# Patient Record
Sex: FEMALE | Race: WHITE
[De-identification: names, ages, dates, MRNs, and addresses within clinical notes are randomized per-mention and may not be internally consistent; named-entity substitution may affect disease eponyms.]

---

## 2020-02-04 ENCOUNTER — HOSPITAL ENCOUNTER (EMERGENCY)
Dept: HOSPITAL 72 - EMR | Age: 70
Discharge: LEFT BEFORE DISCHARGE | End: 2020-02-04
Payer: MEDICARE

## 2020-02-04 VITALS — HEIGHT: 64 IN | WEIGHT: 126 LBS | BODY MASS INDEX: 21.51 KG/M2

## 2020-02-04 VITALS — SYSTOLIC BLOOD PRESSURE: 142 MMHG | DIASTOLIC BLOOD PRESSURE: 80 MMHG

## 2020-02-04 VITALS — SYSTOLIC BLOOD PRESSURE: 147 MMHG | DIASTOLIC BLOOD PRESSURE: 73 MMHG

## 2020-02-04 VITALS — SYSTOLIC BLOOD PRESSURE: 142 MMHG | DIASTOLIC BLOOD PRESSURE: 75 MMHG

## 2020-02-04 VITALS — DIASTOLIC BLOOD PRESSURE: 78 MMHG | SYSTOLIC BLOOD PRESSURE: 139 MMHG

## 2020-02-04 DIAGNOSIS — E78.00: ICD-10-CM

## 2020-02-04 DIAGNOSIS — Z88.6: ICD-10-CM

## 2020-02-04 DIAGNOSIS — R42: Primary | ICD-10-CM

## 2020-02-04 DIAGNOSIS — R53.1: ICD-10-CM

## 2020-02-04 LAB
ADD MANUAL DIFF: NO
ALBUMIN SERPL-MCNC: 3.5 G/DL (ref 3.4–5)
ALBUMIN/GLOB SERPL: 1 {RATIO} (ref 1–2.7)
ALP SERPL-CCNC: 73 U/L (ref 46–116)
ALT SERPL-CCNC: 24 U/L (ref 12–78)
ANION GAP SERPL CALC-SCNC: 11 MMOL/L (ref 5–15)
AST SERPL-CCNC: 22 U/L (ref 15–37)
BASOPHILS NFR BLD AUTO: 0.9 % (ref 0–2)
BILIRUB SERPL-MCNC: 0.3 MG/DL (ref 0.2–1)
BUN SERPL-MCNC: 11 MG/DL (ref 7–18)
CALCIUM SERPL-MCNC: 8.3 MG/DL (ref 8.5–10.1)
CHLORIDE SERPL-SCNC: 103 MMOL/L (ref 98–107)
CO2 SERPL-SCNC: 26 MMOL/L (ref 21–32)
CREAT SERPL-MCNC: 0.9 MG/DL (ref 0.55–1.3)
EOSINOPHIL NFR BLD AUTO: 5.8 % (ref 0–3)
ERYTHROCYTE [DISTWIDTH] IN BLOOD BY AUTOMATED COUNT: 11.4 % (ref 11.6–14.8)
GLOBULIN SER-MCNC: 3.5 G/DL
HCT VFR BLD CALC: 35.2 % (ref 37–47)
HGB BLD-MCNC: 12.2 G/DL (ref 12–16)
LYMPHOCYTES NFR BLD AUTO: 41.3 % (ref 20–45)
MCV RBC AUTO: 90 FL (ref 80–99)
MONOCYTES NFR BLD AUTO: 6.2 % (ref 1–10)
NEUTROPHILS NFR BLD AUTO: 45.9 % (ref 45–75)
PLATELET # BLD: 222 K/UL (ref 150–450)
POTASSIUM SERPL-SCNC: 4.3 MMOL/L (ref 3.5–5.1)
RBC # BLD AUTO: 3.91 M/UL (ref 4.2–5.4)
SODIUM SERPL-SCNC: 140 MMOL/L (ref 136–145)
WBC # BLD AUTO: 7.1 K/UL (ref 4.8–10.8)

## 2020-02-04 PROCEDURE — 36415 COLL VENOUS BLD VENIPUNCTURE: CPT

## 2020-02-04 PROCEDURE — 80053 COMPREHEN METABOLIC PANEL: CPT

## 2020-02-04 PROCEDURE — 96374 THER/PROPH/DIAG INJ IV PUSH: CPT

## 2020-02-04 PROCEDURE — 70450 CT HEAD/BRAIN W/O DYE: CPT

## 2020-02-04 PROCEDURE — 85025 COMPLETE CBC W/AUTO DIFF WBC: CPT

## 2020-02-04 PROCEDURE — 70551 MRI BRAIN STEM W/O DYE: CPT

## 2020-02-04 PROCEDURE — 99284 EMERGENCY DEPT VISIT MOD MDM: CPT

## 2020-02-04 NOTE — NUR
ED Nurse Note: Pt brought in by ambulance c/o dizziness and vomiting x 3 hours. 
Pt became so dizzy that she sat herself on the floor. Pt denies fall/loss of 
consciousness/head injury. A+Ox4, speaking in full sentences. Respirations even 
and unlabored on room air. Pt is not actively vomiting at this time. Pt placed 
on monitor. Left FA 20 g inserted by paramedics flushed and patent.

## 2020-02-04 NOTE — NUR
AMA: SEE AMA FORM. Pt did not want to be admitted upstairs to telemetry because 
she "needs to get home." ED MD spoke with pt about risks and benefits, but pt 
still wished to leave AMA. Pt signed form and is aware of the need to come back 
to the ED if she feels any symptoms of dizziness or lightheadedness again. 
Sister @ bedside with patient. Respirations even and unlabored on room air. 
Vitals stable as documented. Prescription given to patient. Pt verbalizes 
understanding. ID and IV removed. Pt taking all belongings and walking with 
steady gait.

## 2020-02-04 NOTE — DIAGNOSTIC IMAGING REPORT
Indication: Syncope. Nausea vomiting

 

Technique: The head was imaged in a 1.5 Mirela magnet. Sequences obtained include

sagittal and axial T1 FLAIR, axial T2 fast spin echo with fat saturation, axial T2*

GRE, axial T2 FLAIR, diffusion and ADC map.

 

Comparison: None

 

Findings: 

 

The size, contour, and configuration of the sulci, ventricles, and basal cisterns

appear normal. Gray-white differentiation is normal.

 

 There is no restricted diffusion. There is no mass effect, midline shift, edema, or

hemorrhage. There are no abnormal extra-axial or intra-axial fluid collections. 

 

The corpus callosum is unremarkable. The brainstem and cerebellum are unremarkable.

The sella is unremarkable. Bone marrow signal within the visualized osseous

structures appears age appropriate and unremarkable otherwise.

 

 

Impression: Negative MRI brain without contrast.

## 2020-02-04 NOTE — EMERGENCY ROOM REPORT
History of Present Illness


General


Chief Complaint:  Dizziness


Source:  Patient


 (Stanton Head MD)





Present Illness


HPI


She is a 69-year-old female brought in by EMS after increased dizziness.  

Patient a prior history of high cholesterol as well as vertigo episodes in the 

past.  She reports of increased ringing to ears.   had acute onset of vertigo 

sensation.  Reports having recent episode which worse with head movement.  

Denies any weakness to her extremities.  Had recently traveled from [facility] but 

denies any diarrhea or vomit or vomiting since then and had previously been 

well.


 (Stanton Head MD)


Allergies:  


Coded Allergies:  


     CODEINE (Verified  Allergy, Intermediate, 2/4/20)





Review of Systems


All Other Systems:  negative except mentioned in HPI


 (Stanton Head MD)





Physical Exam





Vital Signs








  Date Time  Temp Pulse Resp B/P (MAP) Pulse Ox O2 Delivery O2 Flow Rate FiO2


 


2/4/20 12:47 98.1 58 16 147/73 (97) 97   








Sp02 EP Interpretation:  reviewed, normal


General Appearance:  normal inspection, well appearing, no apparent distress, 

alert, GCS 15


Head:  atraumatic


ENT:  normal ENT inspection, hearing grossly normal, normal voice


Neck:  normal inspection, full range of motion, supple, no bony tend


Respiratory:  normal inspection, lungs clear, normal breath sounds, no 

respiratory distress, no retraction, no wheezing


Cardiovascular #1:  regular rate, rhythm, no edema


Gastrointestinal:  normal inspection, normal bowel sounds, non tender, soft, no 

guarding, no hernia


Genitourinary:  no CVA tenderness


Musculoskeletal:  normal inspection, back normal, normal range of motion


Neurologic:  alert, oriented x3, responsive, speech normal, normal inspection, 

other - Nystagmus with leftward gaze.


Psychiatric:  normal inspection, judgement/insight normal, mood/affect normal


 (Stanton Head MD)





Medical Decision Making


Diagnostic Impression:  


 Primary Impression:  


 Dizziness


 Additional Impression:  


 Possible TIA


ER Course


Patient presented for increased dizziness and weakness.  Differential diagnosis 

include was not limited to transient ischemic attack, peripheral vertigo, 

vertebrobasilar insufficiency, labyrinthitis, among others.  Because of 

complexity of patient's case laboratory tests and imaging studies were ordered.

  Patient's laboratory testing was unremarkable.  No hyponatremia or  anemia.  

CT imaging read by radiology showed no evidence of acute intracranial 

hemorrhage or CVA.  Patient was endorsed to Dr. Lewis pending further imaging 

studies.





Labs








Test


  2/4/20


13:20


 


White Blood Count


  7.1 K/UL


(4.8-10.8)


 


Red Blood Count


  3.91 M/UL


(4.20-5.40)


 


Hemoglobin


  12.2 G/DL


(12.0-16.0)


 


Hematocrit


  35.2 %


(37.0-47.0)


 


Mean Corpuscular Volume 90 FL (80-99) 


 


Mean Corpuscular Hemoglobin


  31.2 PG


(27.0-31.0)


 


Mean Corpuscular Hemoglobin


Concent 34.7 G/DL


(32.0-36.0)


 


Red Cell Distribution Width


  11.4 %


(11.6-14.8)


 


Platelet Count


  222 K/UL


(150-450)


 


Mean Platelet Volume


  5.4 FL


(6.5-10.1)


 


Neutrophils (%) (Auto)


  45.9 %


(45.0-75.0)


 


Lymphocytes (%) (Auto)


  41.3 %


(20.0-45.0)


 


Monocytes (%) (Auto)


  6.2 %


(1.0-10.0)


 


Eosinophils (%) (Auto)


  5.8 %


(0.0-3.0)


 


Basophils (%) (Auto)


  0.9 %


(0.0-2.0)


 


Sodium Level


  140 MMOL/L


(136-145)


 


Potassium Level


  4.3 MMOL/L


(3.5-5.1)


 


Chloride Level


  103 MMOL/L


()


 


Carbon Dioxide Level


  26 MMOL/L


(21-32)


 


Anion Gap


  11 mmol/L


(5-15)


 


Blood Urea Nitrogen


  11 mg/dL


(7-18)


 


Creatinine


  0.9 MG/DL


(0.55-1.30)


 


Estimat Glomerular Filtration


Rate > 60 mL/min


(>60)


 


Glucose Level


  139 MG/DL


()


 


Calcium Level


  8.3 MG/DL


(8.5-10.1)


 


Total Bilirubin


  0.3 MG/DL


(0.2-1.0)


 


Aspartate Amino Transf


(AST/SGOT) 22 U/L (15-37) 


 


 


Alanine Aminotransferase


(ALT/SGPT) 24 U/L (12-78) 


 


 


Alkaline Phosphatase


  73 U/L


()


 


Total Protein


  7.0 G/DL


(6.4-8.2)


 


Albumin


  3.5 G/DL


(3.4-5.0)


 


Globulin 3.5 g/dL 


 


Albumin/Globulin Ratio 1.0 (1.0-2.7) 








 (Stanton Head MD)


ER Course


This patient was turned over to me by Dr. Head.  He felt this patient had a 

TIA and should be admitted for further evaluation and treatment.  I was able to 

obtain an MRI brain and this was negative.  However, the patient declined 

admission and left AGAINST MEDICAL ADVICE.  I did educate the patient that a 

TIA can be a warning of an unstable plaque and could be a prodrome to a CVA.  

The patient indicated understanding.  She declined admission and left AGAINST 

MEDICAL ADVICE.





Laboratory Tests








Test


  2/4/20


13:20


 


White Blood Count


  7.1 K/UL


(4.8-10.8)


 


Red Blood Count


  3.91 M/UL


(4.20-5.40)  L


 


Hemoglobin


  12.2 G/DL


(12.0-16.0)


 


Hematocrit


  35.2 %


(37.0-47.0)  L


 


Mean Corpuscular Volume 90 FL (80-99)  


 


Mean Corpuscular Hemoglobin


  31.2 PG


(27.0-31.0)  H


 


Mean Corpuscular Hemoglobin


Concent 34.7 G/DL


(32.0-36.0)


 


Red Cell Distribution Width


  11.4 %


(11.6-14.8)  L


 


Platelet Count


  222 K/UL


(150-450)


 


Mean Platelet Volume


  5.4 FL


(6.5-10.1)  L


 


Neutrophils (%) (Auto)


  45.9 %


(45.0-75.0)


 


Lymphocytes (%) (Auto)


  41.3 %


(20.0-45.0)


 


Monocytes (%) (Auto)


  6.2 %


(1.0-10.0)


 


Eosinophils (%) (Auto)


  5.8 %


(0.0-3.0)  H


 


Basophils (%) (Auto)


  0.9 %


(0.0-2.0)


 


Sodium Level


  140 MMOL/L


(136-145)


 


Potassium Level


  4.3 MMOL/L


(3.5-5.1)


 


Chloride Level


  103 MMOL/L


()


 


Carbon Dioxide Level


  26 MMOL/L


(21-32)


 


Anion Gap


  11 mmol/L


(5-15)


 


Blood Urea Nitrogen


  11 mg/dL


(7-18)


 


Creatinine


  0.9 MG/DL


(0.55-1.30)


 


Estimate Glomerular


Filtration Rate > 60 mL/min


(>60)


 


Glucose Level


  139 MG/DL


()  H


 


Calcium Level


  8.3 MG/DL


(8.5-10.1)  L


 


Total Bilirubin


  0.3 MG/DL


(0.2-1.0)


 


Aspartate Amino Transferase


(AST) 22 U/L (15-37)


 


 


Alanine Aminotransferase (ALT)


  24 U/L (12-78)


 


 


Alkaline Phosphatase


  73 U/L


()


 


Total Protein


  7.0 G/DL


(6.4-8.2)


 


Albumin


  3.5 G/DL


(3.4-5.0)


 


Globulin 3.5 g/dL  


 


Albumin/Globulin Ratio 1.0 (1.0-2.7)  








 (Formerly [facility])


EKG Diagnostic Results


Rate:  normal


Rhythm:  NSR


ST Segments:  no acute changes


 (Stanton Head MD)





CT/MRI/US Diagnostic Results


CT/MRI/US Diagnostic Results :  


   Imaging Test Ordered:  MRI Brain


   Impression


No acute findings.  See official report in the electronic medical record





 (Former[facility])





Last Vital Signs








  Date Time  Temp Pulse Resp B/P (MAP) Pulse Ox O2 Delivery O2 Flow Rate FiO2


 


2/4/20 12:47 98.1 58 16 147/73 (97) 97   








Status:  improved


 (Stanton Head MD)


Disposition:  AGAINST MEDICAL ADVICE


Condition:  Stable


Scripts


Meclizine Hcl* (MECLIZINE*) 25 Mg Tablet


25 MG ORAL THREE TIMES A DAY, #20 TAB


   Prov: Chelsi Nunn [facility]         2/4/20











Stanton Head MD Feb 4, 2020 13:01


[facility] Hospital[facility]  Feb 4, 2020 18:48

## 2020-02-04 NOTE — DIAGNOSTIC IMAGING REPORT
Indication: Increasing dizziness 69-year-old female

 

Technique: Contiguous 5 mm thick transaxial imaging of the head obtained in a Siemens

Sensation 64 slice CT scanner.  Soft tissue and bone windows generated.  Automatic

Exposure Control was utilized.

 

 

Total Dose length Product (DLP):  1426.4 mGycm

 

CT Dose Index Volume (CTDIvol):   62.7 mGy

 

Comparison: none

 

Findings: The size and configuration of the cortical sulci, basal cisterns, and

ventricles are within normal limits for age. There is no mass effect, midline shift,

or edema identified. There is no evidence of acute hemorrhage or abnormal intra-axial

or extra-axial fluid collections. The bones and soft tissues are unremarkable.

 

Impression: No mass effect, edema or acute bleed.

 

 

 

The CT scanner at [facility] is accredited by the [employer] College of

Radiology and the scans are performed using dose optimization techniques as

appropriate to a performed exam including Automatic Exposure control.